# Patient Record
Sex: FEMALE | URBAN - METROPOLITAN AREA
[De-identification: names, ages, dates, MRNs, and addresses within clinical notes are randomized per-mention and may not be internally consistent; named-entity substitution may affect disease eponyms.]

---

## 2021-08-30 ENCOUNTER — OFFICE VISIT (OUTPATIENT)
Dept: URBAN - METROPOLITAN AREA CLINIC 124 | Facility: CLINIC | Age: 21
End: 2021-08-30
Payer: COMMERCIAL

## 2021-08-30 VITALS
TEMPERATURE: 97.2 F | BODY MASS INDEX: 21.86 KG/M2 | WEIGHT: 123.4 LBS | HEIGHT: 63 IN | SYSTOLIC BLOOD PRESSURE: 111 MMHG | DIASTOLIC BLOOD PRESSURE: 61 MMHG | HEART RATE: 67 BPM

## 2021-08-30 DIAGNOSIS — Z83.79 FAMILY HISTORY OF CELIAC DISEASE: ICD-10-CM

## 2021-08-30 DIAGNOSIS — R14.0 BLOATING: ICD-10-CM

## 2021-08-30 DIAGNOSIS — R19.8 BORBORYGMI: ICD-10-CM

## 2021-08-30 DIAGNOSIS — R10.9 ABDOMINAL PAIN, UNSPECIFIED ABDOMINAL LOCATION: ICD-10-CM

## 2021-08-30 DIAGNOSIS — R63.4 WEIGHT LOSS: ICD-10-CM

## 2021-08-30 PROCEDURE — 99204 OFFICE O/P NEW MOD 45 MIN: CPT | Performed by: INTERNAL MEDICINE

## 2021-08-30 RX ORDER — LEVOTHYROXINE SODIUM 0.05 MG/1
1 TABLET IN THE MORNING ON AN EMPTY STOMACH TABLET ORAL ONCE A DAY
Status: ACTIVE | COMMUNITY

## 2021-08-30 NOTE — HPI-TODAY'S VISIT:
21yoF presents for eval of abd pain, gas and bloating. She notes 3 weeks of issues with abd pain. Pain began in the LUQ, stabbing in nature and over time became more of a feeling of a rock in her LUQ-felt heavy and worse with sitting down. This rock feeling resolved when she started miralax and cut out psyllium micheline she was eating daily. During this time was having BM daily but small compared to 2-3/day at baseline. With miralax bowels normalized and no hematochezia or melena. She also cut out dairy and gluten with improvement in bloating and borborygmi. Despite cuting out the above had an episode of epigastric pain last week only with palpation, was off miralax at that time. No N/V. Has had a decrease in appetite over the last month, down 5# in the last month. No GERD sx.   She is on thyroid meds, was hyper then dose decreased and last check euthyroid this spring Mom with celiac

## 2021-09-08 ENCOUNTER — LAB OUTSIDE AN ENCOUNTER (OUTPATIENT)
Dept: URBAN - METROPOLITAN AREA CLINIC 92 | Facility: CLINIC | Age: 21
End: 2021-09-08

## 2021-09-11 LAB
A/G RATIO: 1.6
ABSOLUTE BASOPHILS: 50
ABSOLUTE EOSINOPHILS: 99
ABSOLUTE LYMPHOCYTES: 2753
ABSOLUTE MONOCYTES: 632
ABSOLUTE NEUTROPHILS: 2666
ALBUMIN: 4.3
ALKALINE PHOSPHATASE: 54
ALT (SGPT): 11
AST (SGOT): 15
BASOPHILS: 0.8
BILIRUBIN, TOTAL: 0.4
BUN/CREATININE RATIO: (no result)
BUN: 8
CALCIUM: 9.1
CARBON DIOXIDE, TOTAL: 24
CHLORIDE: 106
CREATININE: 0.56
EGFR AFRICAN AMERICAN: 153
EGFR NON-AFR. AMERICAN: 132
EOSINOPHILS: 1.6
GLOBULIN, TOTAL: 2.7
GLUCOSE: 85
HEMATOCRIT: 40.4
HEMOGLOBIN: 13.2
IMMUNOGLOBULIN A: 268
INTERPRETATION: (no result)
LYMPHOCYTES: 44.4
MCH: 30.6
MCHC: 32.7
MCV: 93.5
MONOCYTES: 10.2
MPV: 9.6
NEUTROPHILS: 43
PLATELET COUNT: 330
POTASSIUM: 4.4
PROTEIN, TOTAL: 7
RDW: 11.8
RED BLOOD CELL COUNT: 4.32
SODIUM: 138
TISSUE TRANSGLUTAMINASE AB, IGA: 1
TSH W/REFLEX TO FT4: 2.04
WHITE BLOOD CELL COUNT: 6.2

## 2021-09-15 ENCOUNTER — TELEPHONE ENCOUNTER (OUTPATIENT)
Dept: URBAN - METROPOLITAN AREA CLINIC 92 | Facility: CLINIC | Age: 21
End: 2021-09-15

## 2021-09-21 ENCOUNTER — DASHBOARD ENCOUNTERS (OUTPATIENT)
Age: 21
End: 2021-09-21

## 2021-09-21 ENCOUNTER — OFFICE VISIT (OUTPATIENT)
Dept: URBAN - METROPOLITAN AREA TELEHEALTH 2 | Facility: TELEHEALTH | Age: 21
End: 2021-09-21
Payer: COMMERCIAL

## 2021-09-21 DIAGNOSIS — K58.1 IRRITABLE BOWEL SYNDROME WITH CONSTIPATION: ICD-10-CM

## 2021-09-21 DIAGNOSIS — R10.84 ABDOMINAL PAIN, GENERALIZED: ICD-10-CM

## 2021-09-21 DIAGNOSIS — Z83.79 FAMILY HISTORY OF CELIAC DISEASE: ICD-10-CM

## 2021-09-21 DIAGNOSIS — R19.8 BORBORYGMI: ICD-10-CM

## 2021-09-21 PROCEDURE — 99214 OFFICE O/P EST MOD 30 MIN: CPT | Performed by: INTERNAL MEDICINE

## 2021-09-21 RX ORDER — LEVOTHYROXINE SODIUM 0.05 MG/1
1 TABLET IN THE MORNING ON AN EMPTY STOMACH TABLET ORAL ONCE A DAY
Status: ACTIVE | COMMUNITY

## 2021-09-21 NOTE — HPI-TODAY'S VISIT:
21yoF seen last month for abd pain, gas and bloating. 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 Initially was a LUQ pain that felt like a rock sitting in her adbomen, improved with miralax and cutting out psyllium micheline she was eating daily. Also noted constipation with small BM daily compared to 2-3/day at baseline. With miralax bowels improved but no change in bloating or pain. No hematochezia or melena. She also cut out dairy and gluten with improvement in bloating and borborygmi.  She is on thyroid meds, was hyper then dose decreased and labs as below. Mom with celiac SIBO not run 2' invalid sample Continues to note  lower abdominal pain that is stabbing in nature, less frequent about 2-3 times/day, worse after eating especially a big meal; Used miralax a few times but still only having BM small each day. NO n/v, GERD, anorexia, weight loss

## 2021-10-28 PROBLEM — 440630006: Status: ACTIVE | Noted: 2021-09-21

## 2021-11-01 ENCOUNTER — OFFICE VISIT (OUTPATIENT)
Dept: URBAN - METROPOLITAN AREA CLINIC 124 | Facility: CLINIC | Age: 21
End: 2021-11-01

## 2021-11-01 RX ORDER — LEVOTHYROXINE SODIUM 0.05 MG/1
1 TABLET IN THE MORNING ON AN EMPTY STOMACH TABLET ORAL ONCE A DAY
Status: ACTIVE | COMMUNITY

## 2021-11-01 NOTE — HPI-TODAY'S VISIT:
21yoF seen in Aug for abd pain, gas and bloating. 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 Initially was a LUQ pain that felt like a rock sitting in her adbomen, improved with miralax and cutting out psyllium micheline she was eating daily. Also noted constipation with small BM daily compared to 2-3/day at baseline. With miralax bowels improved but no change in bloating or pain. No hematochezia or melena. She also cut out dairy and gluten with improvement in bloating and borborygmi.  She is on thyroid meds, was hyper then dose decreased and labs as below. Mom with celiac SIBO not run 2' invalid sample Continued to note  lower abdominal pain that is stabbing in nature, less frequent about 2-3 times/day, worse after eating especially a big meal; Used miralax a few times but still only having BM small each day. NO n/v, GERD, anorexia, weight loss KUB with normal gas-bowel pattern. given trulance